# Patient Record
Sex: FEMALE | Race: WHITE | Employment: OTHER | URBAN - METROPOLITAN AREA
[De-identification: names, ages, dates, MRNs, and addresses within clinical notes are randomized per-mention and may not be internally consistent; named-entity substitution may affect disease eponyms.]

---

## 2020-03-27 ENCOUNTER — TELEPHONE (OUTPATIENT)
Dept: ONCOLOGY | Facility: CLINIC | Age: 75
End: 2020-03-27

## 2020-06-03 ENCOUNTER — ANCILLARY PROCEDURE (OUTPATIENT)
Dept: NUCLEAR MEDICINE | Facility: CLINIC | Age: 75
End: 2020-06-03
Attending: PSYCHIATRY & NEUROLOGY
Payer: COMMERCIAL

## 2020-06-03 DIAGNOSIS — R26.81 UNSTEADY GAIT: ICD-10-CM

## 2020-06-03 DIAGNOSIS — R41.3 MEMORY LOSS: ICD-10-CM

## 2020-06-03 DIAGNOSIS — R25.1 TREMOR: ICD-10-CM

## 2020-06-03 PROCEDURE — 78803 RP LOCLZJ TUM SPECT 1 AREA: CPT

## 2020-06-03 PROCEDURE — A9584 IODINE I-123 IOFLUPANE: HCPCS

## 2023-10-23 ENCOUNTER — TRANSCRIBE ORDERS (OUTPATIENT)
Dept: OTHER | Age: 78
End: 2023-10-23

## 2023-10-23 DIAGNOSIS — G20.C PARKINSONISM (H): Primary | ICD-10-CM

## 2023-10-23 DIAGNOSIS — R41.3 MEMORY LOSS: ICD-10-CM

## 2023-11-03 ENCOUNTER — THERAPY VISIT (OUTPATIENT)
Dept: PHYSICAL THERAPY | Facility: REHABILITATION | Age: 78
End: 2023-11-03
Payer: COMMERCIAL

## 2023-11-03 DIAGNOSIS — G20.C PARKINSONISM (H): Primary | ICD-10-CM

## 2023-11-03 DIAGNOSIS — R41.3 MEMORY LOSS: ICD-10-CM

## 2023-11-03 PROCEDURE — 97161 PT EVAL LOW COMPLEX 20 MIN: CPT | Mod: GP

## 2023-11-03 PROCEDURE — 97112 NEUROMUSCULAR REEDUCATION: CPT | Mod: GP

## 2023-11-03 NOTE — PROGRESS NOTES
PHYSICAL THERAPY EVALUATION  Type of Visit: Evaluation    See electronic medical record for Abuse and Falls Screening details.    Subjective         Here to improve her walking and balance. Diagnosed 3 years ago with Parkinson's. Has done PT before for her Parkinson's [facility], found it somewhat helpful. No falls reported on intake form in past year, did fall down the stairs a few years ago by missing the last step and didn't see it. Has a cane, walker, and walking poles at home and decides if she's going to use one based off where she is going. When going to the mall she uses the walker since she will walk more. 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 Memory loss and word finding difficulties.     Presenting condition or subjective complaint: Parkinson s  Date of onset: 10/23/23 (order date)      Prior diagnostic imaging/testing results: Other Parkinson s scan   Prior therapy history for the same diagnosis, illness or injury: Yes Parkinson s pt,    Prior Level of Function  Transfers: Independent  Ambulation: Independent  ADL: Independent    Living Environment  Social support: With a significant other or spouse   Type of home: House; 2-story   Stairs to enter the home: Yes 2 Is there a railing: Yes   Ramp: No   Stairs inside the home: Yes 4 Is there a railing: Yes   Help at home: Home management tasks (cooking, cleaning)  Equipment owned: Walker with wheels     Employment: Not Applicable    Hobbies/Interests: Crafts,    Patient goals for therapy: Better walking    Pain assessment:  SI pain after short period of walking      Objective   Cognitive Status Examination  Orientation: Oriented to person, place and time   Level of Consciousness: Alert  Follows Commands and Answers Questions: 100% of the time  Personal Safety and Judgement: Intact  Memory: Impaired, Memory loss, unsure how reliable of a historian patient is    OBSERVATION: tremor in  head while sitting   INTEGUMENTARY: Intact  POSTURE: WFL  RANGE OF MOTION: LE ROM WFL  STRENGTH: LE Strength WFL  All BLE motions 4 to 4+/5.     TRANSFERS: Independent    GAIT:   Level of [place]: Contact Guard  Assistive Device(s): None  Gait Deviations: Stance time decreased  Stride length decreased  Jessi decreased  Short, shuffling steps with obvious instabilities   Gait Distance: 10'  Stairs: reciprocal pattern going up, step to pattern coming down, 1 hand on railing    BALANCE:  Balance is poor. See testing below for details.   5x STS: Ue's pushing off thighs, 12.1 seconds   FGA: 8/30 - indicates fall risk   25 ft timed walk: 13.1 seconds - 0.58 m/s gait speed       Assessment & Plan   CLINICAL IMPRESSIONS  Medical Diagnosis: Parkinsonism, memory loss    Treatment Diagnosis: Impaired gait and balance, postural instability, decreased amplitude of movement   Impression/Assessment: Patient is a 78 year old female with gait and balance complaints.  The following significant findings have been identified: Decreased strength, Impaired balance, Impaired gait, Impaired muscle performance, Decreased activity tolerance, and Instability. These impairments interfere with their ability to perform self care tasks, recreational activities, household chores, household mobility, and community mobility as compared to previous level of function.     Clinical Decision Making (Complexity):  Clinical Presentation: Stable/Uncomplicated  Clinical Presentation Rationale: based on medical and personal factors listed in PT evaluation  Clinical Decision Making (Complexity): Low complexity    PLAN OF CARE  Treatment Interventions:  Interventions: Gait Training, Manual Therapy, Neuromuscular Re-education, Therapeutic Activity, Therapeutic Exercise    Long Term Goals     PT Goal 1  Goal Identifier: FGA  Goal Description: Mellissa will improve score on FGA by 4 or more points in order to improve balance and reduce risk for falls.  Target  Date: 01/31/24  PT Goal 2  Goal Identifier: Gait speed  Goal Description: Mellissa will improve gait speed by 0.2 m/s or greater in order to improve safety and efficiency with ambulation.  Target Date: 01/31/24  PT Goal 3  Goal Identifier: Functional LE strength  Goal Description: Mellissa will decrease time taken to complete 5x STS by 2 or more seconds free from instabilities in order to improve functional LE strength.  Target Date: 01/31/24      Frequency of Treatment: 1-2x/week  Duration of Treatment: 90 days    Education Assessment:   Learner/Method: Patient  Education Comments: results of assessment, POC, HEP    Risks and benefits of evaluation/treatment have been explained.   Patient/Family/caregiver agrees with Plan of Care.     Evaluation Time:     PT Eval, Low Complexity Minutes (72749): 25     Signing Clinician: REE FOX [facility]                                                                                   OUTPATIENT PHYSICAL THERAPY      PLAN OF TREATMENT FOR OUTPATIENT REHABILITATION   Patient's Last Name, First Name, Mellissa Contreras YOB: 1945   Provider's Name   [facility]   Medical Record No.  [id number]     Onset Date: 10/23/23 (order date)  Start of Care Date: 11/03/23     Medical Diagnosis:  Parkinsonism, memory loss      PT Treatment Diagnosis:  Impaired gait and balance, postural instability, decreased amplitude of movement Plan of Treatment  Frequency/Duration: 1-2x/week/ 90 days    Certification date from 11/03/23 to 01/31/24         See note for plan of treatment details and functional goals     JAN GALEANO, PT                         I CERTIFY THE NEED FOR THESE SERVICES FURNISHED UNDER        THIS PLAN OF TREATMENT AND WHILE UNDER MY CARE .             Physician Signature               Date    X_____________________________________________________                    Referring Provider:  Vivien  Carmen Matos      Initial Assessment  See Epic Evaluation- Start of Care Date: 11/03/23

## 2023-12-03 ENCOUNTER — HEALTH MAINTENANCE LETTER (OUTPATIENT)
Age: 78
End: 2023-12-03

## 2023-12-08 ENCOUNTER — THERAPY VISIT (OUTPATIENT)
Dept: PHYSICAL THERAPY | Facility: REHABILITATION | Age: 78
End: 2023-12-08
Payer: COMMERCIAL

## 2023-12-08 DIAGNOSIS — G20.C PARKINSONISM (H): Primary | ICD-10-CM

## 2023-12-08 DIAGNOSIS — R26.81 GAIT INSTABILITY: ICD-10-CM

## 2023-12-08 PROCEDURE — 97112 NEUROMUSCULAR REEDUCATION: CPT | Mod: GP

## 2023-12-15 ENCOUNTER — THERAPY VISIT (OUTPATIENT)
Dept: PHYSICAL THERAPY | Facility: REHABILITATION | Age: 78
End: 2023-12-15
Payer: COMMERCIAL

## 2023-12-15 DIAGNOSIS — R26.81 GAIT INSTABILITY: ICD-10-CM

## 2023-12-15 DIAGNOSIS — G20.C PARKINSONISM (H): Primary | ICD-10-CM

## 2023-12-15 PROCEDURE — 97112 NEUROMUSCULAR REEDUCATION: CPT | Mod: GP

## 2023-12-15 PROCEDURE — 97110 THERAPEUTIC EXERCISES: CPT | Mod: GP

## 2023-12-29 ENCOUNTER — THERAPY VISIT (OUTPATIENT)
Dept: PHYSICAL THERAPY | Facility: REHABILITATION | Age: 78
End: 2023-12-29
Payer: COMMERCIAL

## 2023-12-29 DIAGNOSIS — G20.C PARKINSONISM (H): Primary | ICD-10-CM

## 2023-12-29 DIAGNOSIS — R26.81 GAIT INSTABILITY: ICD-10-CM

## 2023-12-29 PROCEDURE — 97112 NEUROMUSCULAR REEDUCATION: CPT | Mod: GP

## 2023-12-29 PROCEDURE — 97110 THERAPEUTIC EXERCISES: CPT | Mod: GP

## 2024-01-05 ENCOUNTER — THERAPY VISIT (OUTPATIENT)
Dept: PHYSICAL THERAPY | Facility: REHABILITATION | Age: 79
End: 2024-01-05
Payer: COMMERCIAL

## 2024-01-05 DIAGNOSIS — R26.81 GAIT INSTABILITY: ICD-10-CM

## 2024-01-05 DIAGNOSIS — G20.C PARKINSONISM (H): Primary | ICD-10-CM

## 2024-01-05 PROCEDURE — 97112 NEUROMUSCULAR REEDUCATION: CPT | Mod: GP

## 2024-01-05 PROCEDURE — 97110 THERAPEUTIC EXERCISES: CPT | Mod: GP

## 2024-01-19 ENCOUNTER — THERAPY VISIT (OUTPATIENT)
Dept: PHYSICAL THERAPY | Facility: REHABILITATION | Age: 79
End: 2024-01-19
Payer: COMMERCIAL

## 2024-01-19 DIAGNOSIS — G20.C PARKINSONISM (H): Primary | ICD-10-CM

## 2024-01-19 DIAGNOSIS — R26.81 GAIT INSTABILITY: ICD-10-CM

## 2024-01-19 PROCEDURE — 97110 THERAPEUTIC EXERCISES: CPT | Mod: GP

## 2024-01-19 PROCEDURE — 97112 NEUROMUSCULAR REEDUCATION: CPT | Mod: GP

## 2024-01-26 ENCOUNTER — THERAPY VISIT (OUTPATIENT)
Dept: PHYSICAL THERAPY | Facility: REHABILITATION | Age: 79
End: 2024-01-26
Payer: COMMERCIAL

## 2024-01-26 DIAGNOSIS — G20.C PARKINSONISM (H): Primary | ICD-10-CM

## 2024-01-26 DIAGNOSIS — R26.81 GAIT INSTABILITY: ICD-10-CM

## 2024-01-26 PROCEDURE — 97110 THERAPEUTIC EXERCISES: CPT | Mod: GP

## 2024-01-26 PROCEDURE — 97750 PHYSICAL PERFORMANCE TEST: CPT | Mod: GP

## 2024-02-29 ENCOUNTER — THERAPY VISIT (OUTPATIENT)
Dept: PHYSICAL THERAPY | Facility: REHABILITATION | Age: 79
End: 2024-02-29
Payer: COMMERCIAL

## 2024-02-29 DIAGNOSIS — R41.3 MEMORY LOSS: ICD-10-CM

## 2024-02-29 DIAGNOSIS — R26.81 GAIT INSTABILITY: Primary | ICD-10-CM

## 2024-02-29 PROCEDURE — 97110 THERAPEUTIC EXERCISES: CPT | Mod: GP | Performed by: PHYSICAL THERAPIST

## 2024-03-01 NOTE — PROGRESS NOTES
[facility]                                                                                   OUTPATIENT PHYSICAL THERAPY    PLAN OF TREATMENT FOR OUTPATIENT REHABILITATION   Patient's Last Name, First Name, Mellissa Contreras YOB: 1945   Provider's Name   [facility]   Medical Record No.  [id number]     Onset Date: 10/23/23 (order date)  Start of Care Date: 11/03/23     Medical Diagnosis:  Parkinsonism, memory loss      PT Treatment Diagnosis:  Impaired gait and balance, postural instability, decreased amplitude of movement Plan of Treatment  Frequency/Duration: 1-2x/week/ 90 days    Certification date from 02/01/24 to 04/25/24         See note for plan of treatment details and functional goals     YARED ROJAS, PT                         I CERTIFY THE NEED FOR THESE SERVICES FURNISHED UNDER        THIS PLAN OF TREATMENT AND WHILE UNDER MY CARE     (Physician attestation of this document indicates review and certification of the therapy plan).              Referring Provider:  Vivien Matos    Initial Assessment  See Epic Evaluation- Start of Care Date: 11/03/23

## 2024-04-02 ENCOUNTER — THERAPY VISIT (OUTPATIENT)
Dept: PHYSICAL THERAPY | Facility: REHABILITATION | Age: 79
End: 2024-04-02
Payer: COMMERCIAL

## 2024-04-02 DIAGNOSIS — R26.81 GAIT INSTABILITY: Primary | ICD-10-CM

## 2024-04-02 DIAGNOSIS — R41.3 MEMORY LOSS: ICD-10-CM

## 2024-04-02 PROCEDURE — 99207 PR NO CHARGE LOS: CPT | Mod: GP | Performed by: PHYSICAL THERAPIST

## 2024-04-03 NOTE — PROGRESS NOTES
Patient arrived for her appointment today.   Walked back to the gym space and started on the NuStep machine. While getting started the patient mentioned she has been doing well overall but this morning she had a sudden onset of sharp pain in her left calf/lower leg. She reports this pain has not gone away and nothing she has done has changed the pain since it started. She had an antalgic gait pattern when coming into the clinic.   She reports the tingling sensation in the calf only and pain with standing on the leg.   Patient denies an injury to her low back or lower legs leading up to the onset of pain. She does feel some swelling but this was not visible to the therapist. She did have tenderness to the calf muscle, pain with weight bearing and DF of the foot.   She reports her  has had a DVT in the past and reported to her similar type of symptoms .   After discussion it was determine the patient shoulder at minimum call her clinic nurse triage line to assess the leg further, if not go into an urgent care or ER to have the leg assessed further.   Other than completing time on the NuStep no other treatment was provided.     Patient will call to reschedule an appointment here at a later date.     Bethany Pantoja, PT, DPT, [facility]

## 2025-01-05 ENCOUNTER — HEALTH MAINTENANCE LETTER (OUTPATIENT)
Age: 80
End: 2025-01-05